# Patient Record
Sex: MALE | Race: WHITE | NOT HISPANIC OR LATINO | URBAN - METROPOLITAN AREA
[De-identification: names, ages, dates, MRNs, and addresses within clinical notes are randomized per-mention and may not be internally consistent; named-entity substitution may affect disease eponyms.]

---

## 2022-06-10 ENCOUNTER — INPATIENT (INPATIENT)
Facility: HOSPITAL | Age: 72
LOS: 0 days | Discharge: HOME | End: 2022-06-11
Attending: STUDENT IN AN ORGANIZED HEALTH CARE EDUCATION/TRAINING PROGRAM | Admitting: STUDENT IN AN ORGANIZED HEALTH CARE EDUCATION/TRAINING PROGRAM
Payer: MEDICARE

## 2022-06-10 VITALS — WEIGHT: 244.93 LBS

## 2022-06-10 DIAGNOSIS — I48.0 PAROXYSMAL ATRIAL FIBRILLATION: ICD-10-CM

## 2022-06-10 LAB
GLUCOSE BLDC GLUCOMTR-MCNC: 116 MG/DL — HIGH (ref 70–99)
GLUCOSE BLDC GLUCOMTR-MCNC: 121 MG/DL — HIGH (ref 70–99)
GLUCOSE BLDC GLUCOMTR-MCNC: 121 MG/DL — HIGH (ref 70–99)
GLUCOSE BLDC GLUCOMTR-MCNC: 124 MG/DL — HIGH (ref 70–99)

## 2022-06-10 PROCEDURE — 93325 DOPPLER ECHO COLOR FLOW MAPG: CPT | Mod: 26

## 2022-06-10 PROCEDURE — 93312 ECHO TRANSESOPHAGEAL: CPT | Mod: 26

## 2022-06-10 PROCEDURE — 93320 DOPPLER ECHO COMPLETE: CPT | Mod: 26

## 2022-06-10 PROCEDURE — 71045 X-RAY EXAM CHEST 1 VIEW: CPT | Mod: 26

## 2022-06-10 RX ORDER — CARVEDILOL PHOSPHATE 80 MG/1
25 CAPSULE, EXTENDED RELEASE ORAL EVERY 12 HOURS
Refills: 0 | Status: DISCONTINUED | OUTPATIENT
Start: 2022-06-10 | End: 2022-06-11

## 2022-06-10 RX ORDER — DEXTROSE 50 % IN WATER 50 %
25 SYRINGE (ML) INTRAVENOUS ONCE
Refills: 0 | Status: DISCONTINUED | OUTPATIENT
Start: 2022-06-10 | End: 2022-06-11

## 2022-06-10 RX ORDER — ATORVASTATIN CALCIUM 80 MG/1
80 TABLET, FILM COATED ORAL AT BEDTIME
Refills: 0 | Status: DISCONTINUED | OUTPATIENT
Start: 2022-06-10 | End: 2022-06-11

## 2022-06-10 RX ORDER — ACETAMINOPHEN 500 MG
650 TABLET ORAL EVERY 6 HOURS
Refills: 0 | Status: DISCONTINUED | OUTPATIENT
Start: 2022-06-10 | End: 2022-06-11

## 2022-06-10 RX ORDER — PANTOPRAZOLE SODIUM 20 MG/1
40 TABLET, DELAYED RELEASE ORAL
Refills: 0 | Status: DISCONTINUED | OUTPATIENT
Start: 2022-06-10 | End: 2022-06-11

## 2022-06-10 RX ORDER — CEFAZOLIN SODIUM 1 G
2000 VIAL (EA) INJECTION ONCE
Refills: 0 | Status: COMPLETED | OUTPATIENT
Start: 2022-06-10 | End: 2022-06-10

## 2022-06-10 RX ORDER — DAPAGLIFLOZIN 10 MG/1
10 TABLET, FILM COATED ORAL DAILY
Refills: 0 | Status: DISCONTINUED | OUTPATIENT
Start: 2022-06-10 | End: 2022-06-11

## 2022-06-10 RX ORDER — INSULIN LISPRO 100/ML
VIAL (ML) SUBCUTANEOUS
Refills: 0 | Status: DISCONTINUED | OUTPATIENT
Start: 2022-06-10 | End: 2022-06-11

## 2022-06-10 RX ORDER — DEXTROSE 50 % IN WATER 50 %
12.5 SYRINGE (ML) INTRAVENOUS ONCE
Refills: 0 | Status: DISCONTINUED | OUTPATIENT
Start: 2022-06-10 | End: 2022-06-11

## 2022-06-10 RX ORDER — SODIUM CHLORIDE 9 MG/ML
1000 INJECTION, SOLUTION INTRAVENOUS
Refills: 0 | Status: DISCONTINUED | OUTPATIENT
Start: 2022-06-10 | End: 2022-06-11

## 2022-06-10 RX ORDER — FUROSEMIDE 40 MG
20 TABLET ORAL DAILY
Refills: 0 | Status: DISCONTINUED | OUTPATIENT
Start: 2022-06-10 | End: 2022-06-11

## 2022-06-10 RX ORDER — ALBUTEROL 90 UG/1
1 AEROSOL, METERED ORAL DAILY
Refills: 0 | Status: DISCONTINUED | OUTPATIENT
Start: 2022-06-10 | End: 2022-06-11

## 2022-06-10 RX ORDER — METFORMIN HYDROCHLORIDE 850 MG/1
1000 TABLET ORAL DAILY
Refills: 0 | Status: DISCONTINUED | OUTPATIENT
Start: 2022-06-10 | End: 2022-06-10

## 2022-06-10 RX ORDER — RIVAROXABAN 15 MG-20MG
20 KIT ORAL DAILY
Refills: 0 | Status: DISCONTINUED | OUTPATIENT
Start: 2022-06-10 | End: 2022-06-11

## 2022-06-10 RX ORDER — TAMSULOSIN HYDROCHLORIDE 0.4 MG/1
0.8 CAPSULE ORAL AT BEDTIME
Refills: 0 | Status: DISCONTINUED | OUTPATIENT
Start: 2022-06-10 | End: 2022-06-11

## 2022-06-10 RX ORDER — GLUCAGON INJECTION, SOLUTION 0.5 MG/.1ML
1 INJECTION, SOLUTION SUBCUTANEOUS ONCE
Refills: 0 | Status: DISCONTINUED | OUTPATIENT
Start: 2022-06-10 | End: 2022-06-11

## 2022-06-10 RX ORDER — SACUBITRIL AND VALSARTAN 24; 26 MG/1; MG/1
1 TABLET, FILM COATED ORAL
Refills: 0 | Status: DISCONTINUED | OUTPATIENT
Start: 2022-06-10 | End: 2022-06-11

## 2022-06-10 RX ORDER — DEXTROSE 50 % IN WATER 50 %
15 SYRINGE (ML) INTRAVENOUS ONCE
Refills: 0 | Status: DISCONTINUED | OUTPATIENT
Start: 2022-06-10 | End: 2022-06-11

## 2022-06-10 RX ORDER — AMLODIPINE BESYLATE 2.5 MG/1
5 TABLET ORAL DAILY
Refills: 0 | Status: DISCONTINUED | OUTPATIENT
Start: 2022-06-10 | End: 2022-06-11

## 2022-06-10 RX ADMIN — RIVAROXABAN 20 MILLIGRAM(S): KIT at 17:55

## 2022-06-10 RX ADMIN — CARVEDILOL PHOSPHATE 25 MILLIGRAM(S): 80 CAPSULE, EXTENDED RELEASE ORAL at 17:31

## 2022-06-10 RX ADMIN — Medication 1 APPLICATION(S): at 17:31

## 2022-06-10 RX ADMIN — SACUBITRIL AND VALSARTAN 1 TABLET(S): 24; 26 TABLET, FILM COATED ORAL at 17:31

## 2022-06-10 RX ADMIN — ATORVASTATIN CALCIUM 80 MILLIGRAM(S): 80 TABLET, FILM COATED ORAL at 21:21

## 2022-06-10 RX ADMIN — Medication 650 MILLIGRAM(S): at 17:55

## 2022-06-10 RX ADMIN — Medication 100 MILLIGRAM(S): at 11:15

## 2022-06-10 RX ADMIN — TAMSULOSIN HYDROCHLORIDE 0.8 MILLIGRAM(S): 0.4 CAPSULE ORAL at 21:22

## 2022-06-10 RX ADMIN — Medication 650 MILLIGRAM(S): at 18:25

## 2022-06-10 NOTE — PATIENT PROFILE ADULT - FALL HARM RISK - HARM RISK INTERVENTIONS

## 2022-06-10 NOTE — H&P ADULT - ASSESSMENT
73 yo M with hx of AF admitted for ablation    Plan:  Observe in telemetry overnight  Xarelto 20mg tonight  Continue all other home medications  Anticipate DC in AM

## 2022-06-10 NOTE — H&P ADULT - NSICDXPASTMEDICALHX_GEN_ALL_CORE_FT
PAST MEDICAL HISTORY:  CAD (coronary artery disease)     COPD (chronic obstructive pulmonary disease)     Diabetes mellitus     Hyperlipidemia     Hypertension

## 2022-06-10 NOTE — CHART NOTE - NSCHARTNOTEFT_GEN_A_CORE
Cardiac Electrophysiology Procedure Note  	  PROCEDURE:  Electrophysiological Study  Administration of Medicines  Intracardiac Ultrasound  Right and left atrial catheterization with dual transeptal approach  CryoAblation of pulmonary veins to treat Atrial Fibrillation  CS Cannulation  Computerized 3D mapping  Fluoroscopy      INDICATION: Patient is a 71 yo M with hx of CAD sp PCI and CABG, chronic systolic HF sp Biv-ICD (St Rick), HLD, DM, BPH, HTN, AAA, known LBBB and paroxysmal AF with increasing burden is referred fro AF ablative therapy based on the CASTLE-AF trial.      OPERATORS:    Attending	 Maximilian Mina MD, PhD, [facility]    MATERIALS    Sheath	Insertion Site	Catheter	Location  12 F	RFV	Flex Cath	LA and RA  7 Fr	RFV	Daig Decapolar CS	Coronary Sinus  11 Fr	LFV	ICE	RA      ABLATION CATHETERS    Diameter	Size	Type	System  12 F	28 mm	Arctic Front Cryoballoon	Cryo      BASELINE FINDINGS    Rhythm	Post ablation sinus bradycardia CL 1094/ Rate				AH 80	HV 82 ms  						    DESCRIPTION OF PROCEDURE    The patient was brought to the Procedure Room in a nonsedated and fasting state. Informed, written consent was obtained prior to the procedure. Anesthesia maintain comfort and analgesia throughout the procedure. Blood pressure, oxygenation and level of comfort were stable throughout. The right and left groin  were cleaned and prepped with the applications of Chloraprep. Patient was then covered from head to toe with sterile drapes in the usual manner.     The left right inguinal areas and arterial pulse were defined    Next, using needle and guidewire, the right femoral vein was accessed once using modified Seldinger technique. All access was obtain using ultrasound guidance. The guidewires were followed up the IVC, right of the spine, to confirm venous access. One introducer sheath was placed into the femoral vein. The dilators and guidewires were removed. Then, using needle and guidewire, the left femoral vein was accessed Twice using modified Seldinger technique. The guidewires were followed up the IVC, right of the spine, to confirm venous access. Two introducer sheaths were placed into the femoral vein. The dilators and guidewires were removed.     Catheters were placed in the coronary sinus, at the high right atrial position, over the septal tricuspid valve area to obtain and confirm a proximal His signal, and at the RV. Diastolic thresholds were found to be adequate.   	  Baseline parameters were obtained and intervals were measured at these sites post ablation. Pacing was performed to evaluate, AVN function and possible arrhythmia induction. Pacing performed at the RV and LV to evaluate retrograde AVN function and evaluate for possible accessory pathways by evaluating retrograde atrial activation pattern and VH to A relationship.    A 10F  ICE ultrasound catheter was placed through the 11F sheath and positioned into the right atrium to allow visualization of the intra-atrial septum. One SL-1 sheath was inserted in the right femoral vein. Single transeptal puncture was performed with BRK1 needle and one St. Rick 8 F degree SL-1 sheaths via the right femoral vein under direct visualization with intracardiac echocardiography and  fluoroscopy guidance. A J wire was followed to the left subclavian vein and the sheath and dilator combo inserted to that level. The wire was exchanged for the BRK1 needle and pulled back under fluoroscopic visualization until the interatrial septum was reached. On all occasions, the sheath needle combo was placed over the septum and into the left atrium with needle advancement. Left atrial location was confirmed for each transeptal puncture with pressure monitoring, micro-bubble confirmation on ICE, after withdrawal of bright red blood from the catheter and with advancement of a guide wire into the left pulmonary vein. Left atrial pressure was measured at  5 mmHg. The SL-1 sheaths were exchanged over a J wire located in the left pulmonary vein for the Medtronic FlexCath adjustable catheter. Intravenous heparin was given prior to performing transeptal puncture and ACTs followed during the rest of the procedure to ensure an ACT greater than 350 secs.     Pulmonary veins diameter was assessed using a combination of ICE. A Lasso catheter was placed in the left atria and sequential mapping and ablation performed in the pulmonary veins using ICE and fluoroscopy guidance. CARLO 3D mapping was utilized to assess the position of the lasso catheter.      Esophageal temperature was measure using a esophageal temperature probe and if temperature reached below 25 degrees, freezing was immediately terminated.    The achieve was inserted in to the one of the branches of each PV. The cryoballon (28 mm) was inflated and advanced to the os of each PV. Contrast was injected and pressure was recorded to confirm adequate occlusion. Each PV was isolated. During the ablation, the patient did not experience symptoms. Each freeze was no longer than 3 minutes. If the slope of temperature descent was too steep or the temperature was < 50C the ablation was terminated. All PV were electrically isolated.    During the cryoablation of the right superior and inferior veins, CS catheter was inserted into the superior vena cava and the right phrenic nerve was paced at 1000ms cycle length. The movement of the diaphragm was manually monitored during the entire freeze and if the strength of contraction decreased the cryo was terminated.   L common vein was present and therefore segmental isolation was done given large size of the vein. LL and RL veins requied 2 applications given TTI was ~ 60-69 sec. RU vein - only single 3 min freeze was used given TTI of 29 sec. Total freeze time 1333 sec,.    Patient remained in AF at the end of eradicating of all the potentials in all the veins. Next patients was sedated using and externally cardioverted with 200 and then 360J to SR     Exit block was documented by pacing from the each pole of the lasso catheter and Entrance block was evident when pacing form the distal CS. All PV were electrically  isolated.    Ventricular pacing showed no VA conduction WCKCL ms.  Atrial pacing showed AVN WCKCL was 430 msec. Antegrade AVN ,330  ms    A limited intracardiac echocardiogram was performed and no evidence of significant pericardial effusion was seen.    The catheters were removed and the sheaths pulled after a figure-8 stitch was placed in the R groin and Perclose x2 was used in the L groin . A dry, sterile dressing was placed over this. The patient was returned to a hospital room in stable condition. Gauze and needle count were performed and found to be consistent at the end of the procedure      COMPLICATIONS:    The patient tolerated the procedure well. There were no immediate complications.       CONCLUSIONS:    Successful Cryoablation of the left common, right superior, left inferior and right inferior pulmonary veins with ablation of PV potentials and entrance / exit block as endpoints.   Normal AV breanna function  No evidence for antegradely no retogradely conducting accessory pathway.

## 2022-06-10 NOTE — H&P ADULT - HISTORY OF PRESENT ILLNESS
Patient is a 73 yo M with hx of CAD sp PCI and CABG, chronic systolic HF sp DC AICD (St Rick), HLD, DM, BPH, HTN, AAA, known LBBB and AF admitted for ablation. Patient denies any chest pain, palpitations, dizziness or SOB.

## 2022-06-10 NOTE — H&P ADULT - NSHPREVIEWOFSYSTEMS_GEN_ALL_CORE
CONST: No fever, chills or bodyaches  CARD: No chest pain, palpitations  RESP: No SOB, cough, hemoptysis. No hx of asthma or COPD  MS: No joint pain, back pain or extremity pain/injury  NEURO: No headache, dizziness, paresthesias or LOC

## 2022-06-10 NOTE — H&P ADULT - NSHPPHYSICALEXAM_GEN_ALL_CORE
CONST: Well appearing in NAD  EYES: PERRL, EOMI, Sclera and conjunctiva clear.   CARD: Normal S1 S2; Irregular rate and rhythm  RESP: Equal BS B/L, No wheezes, rhonchi or rales. No distress  MS: No LE edema BL  NEURO: A&Ox3, No focal deficits.

## 2022-06-11 ENCOUNTER — TRANSCRIPTION ENCOUNTER (OUTPATIENT)
Age: 72
End: 2022-06-11

## 2022-06-11 VITALS
TEMPERATURE: 99 F | RESPIRATION RATE: 18 BRPM | DIASTOLIC BLOOD PRESSURE: 57 MMHG | SYSTOLIC BLOOD PRESSURE: 126 MMHG | HEART RATE: 70 BPM

## 2022-06-11 LAB
GLUCOSE BLDC GLUCOMTR-MCNC: 108 MG/DL — HIGH (ref 70–99)
GLUCOSE BLDC GLUCOMTR-MCNC: 121 MG/DL — HIGH (ref 70–99)
GLUCOSE BLDC GLUCOMTR-MCNC: 131 MG/DL — HIGH (ref 70–99)
HCV AB S/CO SERPL IA: 0.04 COI — SIGNIFICANT CHANGE UP
HCV AB SERPL-IMP: SIGNIFICANT CHANGE UP

## 2022-06-11 PROCEDURE — 93010 ELECTROCARDIOGRAM REPORT: CPT

## 2022-06-11 PROCEDURE — 99239 HOSP IP/OBS DSCHRG MGMT >30: CPT | Mod: GC

## 2022-06-11 PROCEDURE — 99232 SBSQ HOSP IP/OBS MODERATE 35: CPT

## 2022-06-11 RX ORDER — FOLIC ACID 0.8 MG
1 TABLET ORAL
Qty: 0 | Refills: 0 | DISCHARGE

## 2022-06-11 RX ORDER — MORPHINE SULFATE 50 MG/1
2 CAPSULE, EXTENDED RELEASE ORAL ONCE
Refills: 0 | Status: DISCONTINUED | OUTPATIENT
Start: 2022-06-11 | End: 2022-06-11

## 2022-06-11 RX ORDER — PANTOPRAZOLE SODIUM 20 MG/1
1 TABLET, DELAYED RELEASE ORAL
Qty: 0 | Refills: 0 | DISCHARGE

## 2022-06-11 RX ORDER — EPLERENONE 50 MG/1
1 TABLET, FILM COATED ORAL
Qty: 0 | Refills: 0 | DISCHARGE

## 2022-06-11 RX ORDER — AMLODIPINE BESYLATE 2.5 MG/1
1 TABLET ORAL
Qty: 0 | Refills: 0 | DISCHARGE

## 2022-06-11 RX ORDER — ASPIRIN/CALCIUM CARB/MAGNESIUM 324 MG
1 TABLET ORAL
Qty: 0 | Refills: 0 | DISCHARGE

## 2022-06-11 RX ORDER — FUROSEMIDE 40 MG
1 TABLET ORAL
Qty: 0 | Refills: 0 | DISCHARGE

## 2022-06-11 RX ORDER — PREGABALIN 225 MG/1
1 CAPSULE ORAL
Qty: 0 | Refills: 0 | DISCHARGE

## 2022-06-11 RX ORDER — RIVAROXABAN 15 MG-20MG
1 KIT ORAL
Qty: 0 | Refills: 0 | DISCHARGE

## 2022-06-11 RX ORDER — SACUBITRIL AND VALSARTAN 24; 26 MG/1; MG/1
1 TABLET, FILM COATED ORAL
Qty: 0 | Refills: 0 | DISCHARGE

## 2022-06-11 RX ORDER — ALFUZOSIN HYDROCHLORIDE 10 MG/1
1 TABLET, EXTENDED RELEASE ORAL
Qty: 0 | Refills: 0 | DISCHARGE

## 2022-06-11 RX ORDER — CHOLECALCIFEROL (VITAMIN D3) 125 MCG
1 CAPSULE ORAL
Qty: 0 | Refills: 0 | DISCHARGE

## 2022-06-11 RX ORDER — ROSUVASTATIN CALCIUM 5 MG/1
1 TABLET ORAL
Qty: 0 | Refills: 0 | DISCHARGE

## 2022-06-11 RX ORDER — DAPAGLIFLOZIN AND METFORMIN HYDROCHLORIDE 10; 1000 MG/1; MG/1
1 TABLET, FILM COATED, EXTENDED RELEASE ORAL
Qty: 0 | Refills: 0 | DISCHARGE

## 2022-06-11 RX ORDER — ACETAMINOPHEN 500 MG
2 TABLET ORAL
Qty: 0 | Refills: 0 | DISCHARGE
Start: 2022-06-11

## 2022-06-11 RX ORDER — PREGABALIN 225 MG/1
0 CAPSULE ORAL
Qty: 0 | Refills: 0 | DISCHARGE

## 2022-06-11 RX ORDER — CARVEDILOL PHOSPHATE 80 MG/1
1 CAPSULE, EXTENDED RELEASE ORAL
Qty: 0 | Refills: 0 | DISCHARGE

## 2022-06-11 RX ADMIN — RIVAROXABAN 20 MILLIGRAM(S): KIT at 16:51

## 2022-06-11 RX ADMIN — MORPHINE SULFATE 2 MILLIGRAM(S): 50 CAPSULE, EXTENDED RELEASE ORAL at 11:29

## 2022-06-11 RX ADMIN — CARVEDILOL PHOSPHATE 25 MILLIGRAM(S): 80 CAPSULE, EXTENDED RELEASE ORAL at 05:29

## 2022-06-11 RX ADMIN — ALBUTEROL 1 PUFF(S): 90 AEROSOL, METERED ORAL at 07:41

## 2022-06-11 RX ADMIN — PANTOPRAZOLE SODIUM 40 MILLIGRAM(S): 20 TABLET, DELAYED RELEASE ORAL at 05:29

## 2022-06-11 RX ADMIN — CARVEDILOL PHOSPHATE 25 MILLIGRAM(S): 80 CAPSULE, EXTENDED RELEASE ORAL at 17:08

## 2022-06-11 RX ADMIN — AMLODIPINE BESYLATE 5 MILLIGRAM(S): 2.5 TABLET ORAL at 05:30

## 2022-06-11 RX ADMIN — Medication 1 APPLICATION(S): at 05:30

## 2022-06-11 RX ADMIN — SACUBITRIL AND VALSARTAN 1 TABLET(S): 24; 26 TABLET, FILM COATED ORAL at 17:09

## 2022-06-11 RX ADMIN — Medication 20 MILLIGRAM(S): at 05:29

## 2022-06-11 RX ADMIN — SACUBITRIL AND VALSARTAN 1 TABLET(S): 24; 26 TABLET, FILM COATED ORAL at 05:30

## 2022-06-11 RX ADMIN — MORPHINE SULFATE 2 MILLIGRAM(S): 50 CAPSULE, EXTENDED RELEASE ORAL at 11:59

## 2022-06-11 NOTE — DISCHARGE NOTE PROVIDER - PROVIDER TOKENS
FREE:[LAST:[Keeley],FIRST:[mariely],PHONE:[(   )    -],FAX:[(   )    -],[place] [facility] [place]EDULEDAPPT:[06/16/2022],SCHEDULEDAPPTTIME:[12:00 AM]]

## 2022-06-11 NOTE — DISCHARGE NOTE PROVIDER - NSDCFUADDINST_GEN_ALL_CORE_FT
- continue Xarelto  - shower today is ok  - No heavy lifting x 1 week  - Follow up with his EP doctor 6/16 at 4:10 pm [facility]

## 2022-06-11 NOTE — DISCHARGE NOTE PROVIDER - HOSPITAL COURSE
Patient is a 73 yo M with hx of CAD sp PCI and CABG, chronic systolic HF sp Biv-ICD (St Rick), HLD, DM, BPH, HTN, AAA, known LBBB and paroxysmal AF with increasing burden is referred for AF ablative therapy based on the CASTLE-AF trial. On 6/11, pt underwent successful AF ablation with Dr. Mina. B/l groin access sites with diffuse ecchymosis, no active bleeding or hematoma noted. V-paced on telemetry. Patient was hemodynamically stable for discharge home.

## 2022-06-11 NOTE — DISCHARGE NOTE PROVIDER - CARE PROVIDER_API CALL
mariely Mina  at [facility] [place]  Phone: (   )    -  Fax: (   )    -  Scheduled Appointment: 06/16/2022 12:00 AM

## 2022-06-11 NOTE — DISCHARGE NOTE PROVIDER - ATTENDING DISCHARGE PHYSICAL EXAMINATION:
Patient seen and examined. Pertinent labs, imaging and telemetry reviewed. I agree with the above:    Patient feeling well. Some oozing at B/L groin sites overnight, s/p suture and epi/lido injections.   Ecchymosis noted at B/L groin sites without hematoma.  S/P AF ablation.     RRR. S1S2 present.   CTA B/L.   B/L groin sites with ecchymosis but no hematoma.     Patient is stable for discharge home with EP follow up.

## 2022-06-11 NOTE — DISCHARGE NOTE NURSING/CASE MANAGEMENT/SOCIAL WORK - NSDCPEFALRISK_GEN_ALL_CORE
For information on Fall & Injury Prevention, visit: [email or web address] OR  [email or web address] OR  https://www.cdc.gov/steadi/patient.html

## 2022-06-11 NOTE — DISCHARGE NOTE PROVIDER - NSDCCPCAREPLAN_GEN_ALL_CORE_FT
PRINCIPAL DISCHARGE DIAGNOSIS  Diagnosis: Paroxysmal atrial fibrillation  Assessment and Plan of Treatment: s/p successful afib ablation

## 2022-06-11 NOTE — DISCHARGE NOTE NURSING/CASE MANAGEMENT/SOCIAL WORK - PATIENT PORTAL LINK FT
You can access the FollowMyHealth Patient Portal offered by [facility] by registering at the following website: [email or web address]. By joining [employer]’s FollowMyHealth portal, you will also be able to view your health information using other applications (apps) compatible with our system.

## 2022-06-11 NOTE — PROGRESS NOTE ADULT - ASSESSMENT
A/P  Patient S/P Afib Ablation  - continue  Xarelto  - shower today is ok  - No heavy lifting x 1 week  - Follow up with his EP doctor in 2-3 weeks   A/P  Patient S/P Afib Ablation  - continue  Xarelto  - shower today is ok  - No heavy lifting x 1 week  - Follow up with his EP doctor 6/16 at 4:10 pm [facility]

## 2022-06-11 NOTE — PROGRESS NOTE ADULT - SUBJECTIVE AND OBJECTIVE BOX
INTERVAL HPI/OVERNIGHT EVENTS:    Patient s/p Afib ablation.   Bleeding from groin overnight.  Patient in NSR    MEDICATIONS  (STANDING):  ALBUTerol    90 MICROgram(s) HFA Inhaler 1 Puff(s) Inhalation daily  amLODIPine   Tablet 5 milliGRAM(s) Oral daily  atorvastatin 80 milliGRAM(s) Oral at bedtime  carvedilol 25 milliGRAM(s) Oral every 12 hours  clobetasol 0.05% Cream 1 Application(s) Topical two times a day  dapagliflozin 10 milliGRAM(s) Oral daily  dextrose 5%. 1000 milliLiter(s) (100 mL/Hr) IV Continuous <Continuous>  dextrose 5%. 1000 milliLiter(s) (50 mL/Hr) IV Continuous <Continuous>  dextrose 50% Injectable 25 Gram(s) IV Push once  dextrose 50% Injectable 12.5 Gram(s) IV Push once  dextrose 50% Injectable 25 Gram(s) IV Push once  furosemide    Tablet 20 milliGRAM(s) Oral daily  glucagon  Injectable 1 milliGRAM(s) IntraMuscular once  insulin lispro (ADMELOG) corrective regimen sliding scale   SubCutaneous three times a day before meals  pantoprazole    Tablet 40 milliGRAM(s) Oral before breakfast  rivaroxaban 20 milliGRAM(s) Oral daily  sacubitril 49 mG/valsartan 51 mG 1 Tablet(s) Oral two times a day  tamsulosin 0.8 milliGRAM(s) Oral at bedtime    MEDICATIONS  (PRN):  acetaminophen     Tablet .. 650 milliGRAM(s) Oral every 6 hours PRN Mild Pain (1 - 3)  dextrose Oral Gel 15 Gram(s) Oral once PRN Blood Glucose LESS THAN 70 milliGRAM(s)/deciliter      Allergies    No Known Allergies    Intolerances      Vital Signs Last 24 Hrs  T(C): 37.1 (11 Jun 2022 05:12), Max: 37.1 (10 Mike 2022 20:00)  T(F): 98.7 (11 Jun 2022 05:12), Max: 98.8 (10 Mike 2022 20:00)  HR: 75 (11 Jun 2022 05:12) (73 - 81)  BP: 135/60 (11 Jun 2022 05:12) (116/95 - 170/72)  BP(mean): --  RR: 16 (11 Jun 2022 05:12) (16 - 16)  SpO2: 95% (10 Mike 2022 16:30) (95% - 95%)    HEENT DAVID EOMI  Lung CTAB  Heart RRR normal S1 S2  abd +BS soft  groins No hematoma, + ecchymosis; stitches removed.  bleeding when he stands up  Ext no C/C/E

## 2022-06-11 NOTE — DISCHARGE NOTE PROVIDER - NSDCMRMEDTOKEN_GEN_ALL_CORE_FT
acetaminophen 325 mg oral tablet: 2 tab(s) orally every 6 hours, As needed, Mild Pain (1 - 3)  alfuzosin 10 mg oral tablet, extended release: 1 tab(s) orally once a day  amLODIPine 5 mg oral tablet: 1 tab(s) orally once a day  aspirin 81 mg oral delayed release tablet: 1 tab(s) orally once a day  carvedilol 25 mg oral tablet: 1 tab(s) orally 2 times a day  Entresto 49 mg-51 mg oral tablet: 1 tab(s) orally 2 times a day  eplerenone 25 mg oral tablet: 1 tab(s) orally once a day  folic acid 1 mg oral tablet: 1 tab(s) orally once a day  furosemide 20 mg oral tablet: 1 tab(s) orally once a day  pantoprazole 40 mg oral delayed release tablet: 1 tab(s) orally once a day  rosuvastatin 40 mg oral tablet: 1 tab(s) orally once a day  Vitamin B-12:   Vitamin D3 125 mcg (5000 intl units) oral capsule: 1 cap(s) orally once a day  Xarelto 20 mg oral tablet: 1 tab(s) orally once a day (in the evening)  Xigduo XR 10 mg-1000 mg oral tablet, extended release: 1 tab(s) orally once a day (in the morning)   acetaminophen 325 mg oral tablet: 2 tab(s) orally every 6 hours, As needed, Mild Pain (1 - 3)  alfuzosin 10 mg oral tablet, extended release: 1 tab(s) orally once a day  amLODIPine 5 mg oral tablet: 1 tab(s) orally once a day  aspirin 81 mg oral delayed release tablet: 1 tab(s) orally once a day  carvedilol 25 mg oral tablet: 1 tab(s) orally 2 times a day  Entresto 49 mg-51 mg oral tablet: 1 tab(s) orally 2 times a day  eplerenone 25 mg oral tablet: 1 tab(s) orally once a day  folic acid 0.4 mg oral tablet: 1 tab(s) orally once a day  furosemide 20 mg oral tablet: 1 tab(s) orally once a day  pantoprazole 40 mg oral delayed release tablet: 1 tab(s) orally once a day  rosuvastatin 40 mg oral tablet: 1 tab(s) orally once a day  Vitamin B-12 500 mcg oral tablet: 1 tab(s) orally once a day  Vitamin D3 125 mcg (5000 intl units) oral capsule: 1 cap(s) orally once a day  Xarelto 20 mg oral tablet: 1 tab(s) orally once a day (in the evening)  Xigduo XR 10 mg-1000 mg oral tablet, extended release: 1 tab(s) orally once a day (in the morning)

## 2022-06-17 DIAGNOSIS — E78.5 HYPERLIPIDEMIA, UNSPECIFIED: ICD-10-CM

## 2022-06-17 DIAGNOSIS — I71.4 ABDOMINAL AORTIC ANEURYSM, WITHOUT RUPTURE: ICD-10-CM

## 2022-06-17 DIAGNOSIS — I25.10 ATHEROSCLEROTIC HEART DISEASE OF NATIVE CORONARY ARTERY WITHOUT ANGINA PECTORIS: ICD-10-CM

## 2022-06-17 DIAGNOSIS — N40.0 BENIGN PROSTATIC HYPERPLASIA WITHOUT LOWER URINARY TRACT SYMPTOMS: ICD-10-CM

## 2022-06-17 DIAGNOSIS — I44.7 LEFT BUNDLE-BRANCH BLOCK, UNSPECIFIED: ICD-10-CM

## 2022-06-17 DIAGNOSIS — J44.9 CHRONIC OBSTRUCTIVE PULMONARY DISEASE, UNSPECIFIED: ICD-10-CM

## 2022-06-17 DIAGNOSIS — E11.9 TYPE 2 DIABETES MELLITUS WITHOUT COMPLICATIONS: ICD-10-CM

## 2022-06-17 DIAGNOSIS — I11.0 HYPERTENSIVE HEART DISEASE WITH HEART FAILURE: ICD-10-CM

## 2022-06-17 DIAGNOSIS — Z95.5 PRESENCE OF CORONARY ANGIOPLASTY IMPLANT AND GRAFT: ICD-10-CM

## 2022-06-17 DIAGNOSIS — Z95.1 PRESENCE OF AORTOCORONARY BYPASS GRAFT: ICD-10-CM

## 2022-06-17 DIAGNOSIS — Z95.810 PRESENCE OF AUTOMATIC (IMPLANTABLE) CARDIAC DEFIBRILLATOR: ICD-10-CM

## 2022-06-17 DIAGNOSIS — I50.22 CHRONIC SYSTOLIC (CONGESTIVE) HEART FAILURE: ICD-10-CM

## 2022-07-06 ENCOUNTER — APPOINTMENT (OUTPATIENT)
Dept: CARDIOLOGY | Facility: CLINIC | Age: 72
End: 2022-07-06

## 2022-08-01 ENCOUNTER — APPOINTMENT (OUTPATIENT)
Dept: CARDIOLOGY | Facility: CLINIC | Age: 72
End: 2022-08-01
